# Patient Record
Sex: FEMALE | Race: OTHER | NOT HISPANIC OR LATINO | Employment: OTHER | URBAN - METROPOLITAN AREA
[De-identification: names, ages, dates, MRNs, and addresses within clinical notes are randomized per-mention and may not be internally consistent; named-entity substitution may affect disease eponyms.]

---

## 2018-11-29 NOTE — PROCEDURE NOTE: SURGICAL
"<span style=""font-weight: bold;"">MR #:</span> [id number]<br /> <br /> <span style=""font-weight: bold;"">PREOPERATIVE DIAGNOSIS:</span> Cataract

## 2018-12-06 NOTE — PROCEDURE NOTE: SURGICAL
"<span style=""font-weight: bold;"">MR #:</span> [id number]<br /> <br /> <span style=""font-weight: bold;"">PREOPERATIVE DIAGNOSIS:</span> Cataract

## 2019-05-24 NOTE — PATIENT DISCUSSION
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 It can make cataract surgery more difficult and increase the risk of complications including iris damage and posterior capsule rupture with possible need for a second surgery. instructed patient to use Systane night time oint ment OD QHS.

## 2019-05-31 NOTE — PATIENT DISCUSSION
[facility]arr Visual Field 36 point screen: I have reviewed the visual fields both taped and untaped on this patient which demonstrate significant obstruction of the patient's peripheral visual field on both eyes.

## 2019-05-31 NOTE — PATIENT DISCUSSION
PHOTOGRAPHS: I have reviewed the external ocular photographs of this patient which show the following: significant entropion of the right lower eyelid and significant ptosis and dermatochalasis of both upper eyelids.

## 2019-08-13 NOTE — PATIENT DISCUSSION
Resume normal activity. Resume any medications that were discontinued for surgery. Sutures removed without difficulty. Artificial tears prn burning/itching/blurry vision. Wash incisions/ lash line once daily with baby shampoo. Discussed skin care and sun avoidance at length. Sunscreen given. Follow up in one month.

## 2019-09-17 NOTE — PATIENT DISCUSSION
Patient is continuing to heal well following surgery. Reviewed skin care and sun avoidance. Eye cream given. Increase frequency of ATs. Follow up prn.

## 2020-01-01 PROBLEM — Z98.890: Noted: 2020-01-01

## 2021-12-02 ENCOUNTER — NEW PATIENT (OUTPATIENT)
Dept: URBAN - METROPOLITAN AREA CLINIC 9 | Facility: CLINIC | Age: 71
End: 2021-12-02

## 2021-12-02 DIAGNOSIS — H35.363: ICD-10-CM

## 2021-12-02 DIAGNOSIS — H25.13: ICD-10-CM

## 2021-12-02 DIAGNOSIS — H52.223: ICD-10-CM

## 2021-12-02 DIAGNOSIS — H40.013: ICD-10-CM

## 2021-12-02 PROCEDURE — 92004 COMPRE OPH EXAM NEW PT 1/>: CPT

## 2021-12-02 PROCEDURE — 92134 CPTRZ OPH DX IMG PST SGM RTA: CPT

## 2021-12-02 PROCEDURE — 92133 CPTRZD OPH DX IMG PST SGM ON: CPT

## 2021-12-02 PROCEDURE — 92015 DETERMINE REFRACTIVE STATE: CPT

## 2021-12-02 ASSESSMENT — KERATOMETRY
OD_K1POWER_DIOPTERS: 44.00
OD_K2POWER_DIOPTERS: 45.00
OD_AXISANGLE2_DEGREES: 98
OS_K2POWER_DIOPTERS: 45.25
OS_AXISANGLE2_DEGREES: 70
OS_K1POWER_DIOPTERS: 44.50
OS_AXISANGLE_DEGREES: 160
OD_AXISANGLE_DEGREES: 8

## 2021-12-02 ASSESSMENT — TONOMETRY
OD_IOP_MMHG: 18
OS_IOP_MMHG: 16

## 2021-12-02 ASSESSMENT — VISUAL ACUITY
OS_SC: 20/70-1
OD_SC: 20/80-1
OD_GLARE: 20/60-1
OS_GLARE: 20/40
OU_SC: 20/70+1

## 2022-04-19 ENCOUNTER — PRE-OP/H&P (OUTPATIENT)
Dept: URBAN - METROPOLITAN AREA CLINIC 9 | Facility: CLINIC | Age: 72
End: 2022-04-19

## 2022-04-19 VITALS — HEIGHT: 55 IN | DIASTOLIC BLOOD PRESSURE: 70 MMHG | SYSTOLIC BLOOD PRESSURE: 117 MMHG | HEART RATE: 77 BPM

## 2022-04-19 DIAGNOSIS — H25.13: ICD-10-CM

## 2022-04-19 PROCEDURE — 99211PRE PRE OP VISIT

## 2022-04-19 PROCEDURE — 92136 OPHTHALMIC BIOMETRY: CPT

## 2022-04-19 ASSESSMENT — KERATOMETRY
OD_K2POWER_DIOPTERS: 44.75
OS_K2POWER_DIOPTERS: 45.00
OS_AXISANGLE2_DEGREES: 112
OD_K1POWER_DIOPTERS: 44.25
OS_K1POWER_DIOPTERS: 44.50
OD_AXISANGLE2_DEGREES: 95
OS_AXISANGLE_DEGREES: 22
OD_AXISANGLE_DEGREES: 5

## 2022-04-27 ENCOUNTER — COMPREHENSIVE EXAM (OUTPATIENT)
Dept: URBAN - METROPOLITAN AREA CLINIC 9 | Facility: CLINIC | Age: 72
End: 2022-04-27

## 2022-04-27 DIAGNOSIS — H35.3132: ICD-10-CM

## 2022-04-27 DIAGNOSIS — H25.13: ICD-10-CM

## 2022-04-27 PROCEDURE — 92014 COMPRE OPH EXAM EST PT 1/>: CPT

## 2022-04-27 PROCEDURE — 92134 CPTRZ OPH DX IMG PST SGM RTA: CPT

## 2022-04-27 ASSESSMENT — TONOMETRY
OS_IOP_MMHG: 20
OD_IOP_MMHG: 22

## 2022-04-27 ASSESSMENT — KERATOMETRY
OD_K1POWER_DIOPTERS: 44.25
OD_K2POWER_DIOPTERS: 44.75
OS_AXISANGLE2_DEGREES: 112
OS_AXISANGLE_DEGREES: 22
OS_K1POWER_DIOPTERS: 44.50
OS_K2POWER_DIOPTERS: 45.00
OD_AXISANGLE_DEGREES: 5
OD_AXISANGLE2_DEGREES: 95

## 2022-04-27 ASSESSMENT — VISUAL ACUITY
OD_SC: 20/40
OS_SC: 20/60+1

## 2022-05-17 PROBLEM — Z98.890: Noted: 2020-01-01

## 2022-05-17 PROBLEM — Z96.1: Noted: 2022-05-17

## 2022-05-17 ASSESSMENT — KERATOMETRY
OS_AXISANGLE_DEGREES: 22
OD_AXISANGLE2_DEGREES: 95
OD_K2POWER_DIOPTERS: 44.75
OD_AXISANGLE_DEGREES: 5
OD_K1POWER_DIOPTERS: 44.25
OS_AXISANGLE2_DEGREES: 112
OS_K1POWER_DIOPTERS: 44.50
OS_K2POWER_DIOPTERS: 45.00

## 2022-05-18 ENCOUNTER — POST-OP (OUTPATIENT)
Dept: URBAN - METROPOLITAN AREA CLINIC 9 | Facility: CLINIC | Age: 72
End: 2022-05-18

## 2022-05-18 DIAGNOSIS — Z96.1: ICD-10-CM

## 2022-05-18 PROCEDURE — 99024 POSTOP FOLLOW-UP VISIT: CPT

## 2022-05-18 ASSESSMENT — VISUAL ACUITY
OU_CC: 20/25-2
OS_SC: 20/25-2

## 2022-05-18 ASSESSMENT — TONOMETRY: OS_IOP_MMHG: 21

## 2022-05-23 ENCOUNTER — POST OP/EVAL OF SECOND EYE (OUTPATIENT)
Dept: URBAN - METROPOLITAN AREA CLINIC 9 | Facility: CLINIC | Age: 72
End: 2022-05-23

## 2022-05-23 DIAGNOSIS — H25.11: ICD-10-CM

## 2022-05-23 DIAGNOSIS — Z96.1: ICD-10-CM

## 2022-05-23 PROCEDURE — 99024 POSTOP FOLLOW-UP VISIT: CPT

## 2022-05-23 PROCEDURE — 92136 OPHTHALMIC BIOMETRY: CPT

## 2022-05-23 ASSESSMENT — VISUAL ACUITY
OS_SC: J2
OU_SC: J1
OS_SC: 20/25-2
OU_SC: 20/30

## 2022-05-23 ASSESSMENT — KERATOMETRY
OD_AXISANGLE_DEGREES: 180
OS_AXISANGLE_DEGREES: 163
OD_K1POWER_DIOPTERS: 46.00
OD_AXISANGLE2_DEGREES: 90
OD_K2POWER_DIOPTERS: 46.75
OS_K2POWER_DIOPTERS: 45.50
OS_K1POWER_DIOPTERS: 44.25
OS_AXISANGLE2_DEGREES: 73

## 2022-05-23 ASSESSMENT — TONOMETRY: OS_IOP_MMHG: 20

## 2022-06-01 ENCOUNTER — POST-OP (OUTPATIENT)
Dept: URBAN - METROPOLITAN AREA CLINIC 9 | Facility: CLINIC | Age: 72
End: 2022-06-01

## 2022-06-01 DIAGNOSIS — Z96.1: ICD-10-CM

## 2022-06-01 PROCEDURE — 99024 POSTOP FOLLOW-UP VISIT: CPT

## 2022-06-01 RX ORDER — TIMOLOL MALEATE 6.8 MG/ML
1 SOLUTION OPHTHALMIC TWICE A DAY
Start: 2022-06-01

## 2022-06-01 ASSESSMENT — VISUAL ACUITY
OD_SC: 20/80-1
OU_SC: J1
OD_SC: J16
OS_SC: J1
OU_SC: 20/30-2
OS_SC: 20/40

## 2022-06-01 ASSESSMENT — TONOMETRY
OD_IOP_MMHG: 11
OD_IOP_MMHG: 30

## 2022-06-10 ENCOUNTER — POST-OP (OUTPATIENT)
Dept: URBAN - METROPOLITAN AREA CLINIC 9 | Facility: CLINIC | Age: 72
End: 2022-06-10

## 2022-06-10 DIAGNOSIS — Z96.1: ICD-10-CM

## 2022-06-10 PROCEDURE — 99024 POSTOP FOLLOW-UP VISIT: CPT

## 2022-06-10 ASSESSMENT — VISUAL ACUITY
OD_SC: J1+
OS: J2
OD: J7
OS_SC: 20/25-2
OS_SC: J1+
OU_SC: J1+
OD_SC: 20/40+1
OU_SC: 20/25-2
OU: J2

## 2022-06-10 ASSESSMENT — TONOMETRY
OD_IOP_MMHG: 15
OS_IOP_MMHG: 17

## 2022-06-13 ASSESSMENT — KERATOMETRY
OD_AXISANGLE_DEGREES: 11
OD_AXISANGLE2_DEGREES: 101
OS_K1POWER_DIOPTERS: 44.25
OD_K2POWER_DIOPTERS: 45.25
OS_AXISANGLE_DEGREES: 71
OD_K1POWER_DIOPTERS: 44.25
OS_K2POWER_DIOPTERS: 45.50
OS_AXISANGLE2_DEGREES: 161

## 2022-06-20 RX ORDER — INDAPAMIDE 1.25 MG/1
TABLET, FILM COATED ORAL
COMMUNITY

## 2022-06-20 RX ORDER — SPIRONOLACTONE 25 MG/1
TABLET ORAL
COMMUNITY

## 2022-06-20 RX ORDER — IBUPROFEN 200 MG
TABLET ORAL
COMMUNITY

## 2022-06-20 RX ORDER — PRAVASTATIN SODIUM 20 MG
TABLET ORAL
COMMUNITY

## 2022-06-20 RX ORDER — CELECOXIB 100 MG/1
CAPSULE ORAL
COMMUNITY

## 2022-06-20 RX ORDER — CITALOPRAM 20 MG/1
TABLET ORAL
COMMUNITY

## 2022-06-20 RX ORDER — MELOXICAM 7.5 MG/1
TABLET ORAL
COMMUNITY

## 2022-06-30 ENCOUNTER — POST-OP (OUTPATIENT)
Dept: URBAN - METROPOLITAN AREA CLINIC 9 | Facility: CLINIC | Age: 72
End: 2022-06-30

## 2022-06-30 DIAGNOSIS — Z96.1: ICD-10-CM

## 2022-06-30 PROCEDURE — 99024 POSTOP FOLLOW-UP VISIT: CPT

## 2022-06-30 ASSESSMENT — TONOMETRY
OD_IOP_MMHG: 18
OS_IOP_MMHG: 14

## 2022-06-30 ASSESSMENT — KERATOMETRY
OS_K2POWER_DIOPTERS: 45.50
OS_AXISANGLE2_DEGREES: 86
OD_K2POWER_DIOPTERS: 45.25
OS_K1POWER_DIOPTERS: 44.50
OD_AXISANGLE2_DEGREES: 100
OD_AXISANGLE_DEGREES: 010
OD_K1POWER_DIOPTERS: 44.00
OS_AXISANGLE_DEGREES: 176

## 2022-06-30 ASSESSMENT — VISUAL ACUITY
OD_SC: J1+
OS_SC: J1+
OU_SC: 20/20-1
OU_SC: J1+
OD_SC: 20/20-2
OS: J2
OU: J1
OD: J2
OS_SC: 20/20-2

## 2023-10-30 ENCOUNTER — ESTABLISHED PATIENT (OUTPATIENT)
Facility: LOCATION | Age: 73
End: 2023-10-30

## 2023-10-30 DIAGNOSIS — H40.013: ICD-10-CM

## 2023-10-30 DIAGNOSIS — H52.221: ICD-10-CM

## 2023-10-30 DIAGNOSIS — H35.3132: ICD-10-CM

## 2023-10-30 PROCEDURE — 92133 CPTRZD OPH DX IMG PST SGM ON: CPT

## 2023-10-30 PROCEDURE — 92015 DETERMINE REFRACTIVE STATE: CPT

## 2023-10-30 PROCEDURE — 92014 COMPRE OPH EXAM EST PT 1/>: CPT

## 2023-10-30 ASSESSMENT — KERATOMETRY
OS_K2POWER_DIOPTERS: 45.25
OS_K2POWER_DIOPTERS: 45.50
OS_K1POWER_DIOPTERS: 44.25
OD_K2POWER_DIOPTERS: 45.25
OD_K2POWER_DIOPTERS: 44.75
OS_AXISANGLE_DEGREES: 176
OD_AXISANGLE2_DEGREES: 87
OD_AXISANGLE_DEGREES: 010
OD_AXISANGLE2_DEGREES: 100
OD_AXISANGLE_DEGREES: 177
OD_K1POWER_DIOPTERS: 44.25
OD_K1POWER_DIOPTERS: 44.00
OS_AXISANGLE2_DEGREES: 79
OS_AXISANGLE_DEGREES: 169
OS_AXISANGLE2_DEGREES: 86
OS_K1POWER_DIOPTERS: 44.50

## 2023-10-30 ASSESSMENT — TONOMETRY
OD_IOP_MMHG: 21
OS_IOP_MMHG: 20

## 2023-10-30 ASSESSMENT — VISUAL ACUITY
OS_SC: 20/25+1
OU_SC: 20/20-1
OD_SC: 20/25+1

## 2023-11-02 ENCOUNTER — ESTABLISHED PATIENT (OUTPATIENT)
Dept: URBAN - METROPOLITAN AREA CLINIC 11 | Facility: CLINIC | Age: 73
End: 2023-11-02

## 2023-11-02 DIAGNOSIS — H26.493: ICD-10-CM

## 2023-11-02 DIAGNOSIS — H35.372: ICD-10-CM

## 2023-11-02 DIAGNOSIS — Z96.1: ICD-10-CM

## 2023-11-02 DIAGNOSIS — H43.313: ICD-10-CM

## 2023-11-02 DIAGNOSIS — H35.3131: ICD-10-CM

## 2023-11-02 DIAGNOSIS — H43.813: ICD-10-CM

## 2023-11-02 PROCEDURE — 92134 CPTRZ OPH DX IMG PST SGM RTA: CPT

## 2023-11-02 PROCEDURE — 92201 OPSCPY EXTND RTA DRAW UNI/BI: CPT

## 2023-11-02 PROCEDURE — 99214 OFFICE O/P EST MOD 30 MIN: CPT

## 2023-11-02 ASSESSMENT — KERATOMETRY
OD_AXISANGLE2_DEGREES: 87
OD_AXISANGLE_DEGREES: 010
OS_AXISANGLE2_DEGREES: 79
OS_K2POWER_DIOPTERS: 45.50
OS_K1POWER_DIOPTERS: 44.50
OS_K2POWER_DIOPTERS: 45.25
OS_AXISANGLE_DEGREES: 169
OD_K1POWER_DIOPTERS: 44.00
OS_AXISANGLE2_DEGREES: 86
OS_K1POWER_DIOPTERS: 44.25
OD_AXISANGLE_DEGREES: 177
OD_AXISANGLE2_DEGREES: 100
OD_K1POWER_DIOPTERS: 44.25
OD_K2POWER_DIOPTERS: 45.25
OD_K2POWER_DIOPTERS: 44.75
OS_AXISANGLE_DEGREES: 176

## 2023-11-02 ASSESSMENT — TONOMETRY
OD_IOP_MMHG: 12
OS_IOP_MMHG: 11

## 2023-11-02 ASSESSMENT — VISUAL ACUITY
OS_SC: 20/25-1
OD_SC: 20/25+2

## 2023-12-06 ENCOUNTER — SURGERY/PROCEDURE (OUTPATIENT)
Dept: URBAN - METROPOLITAN AREA EYE CENTER 1 | Facility: EYE CENTER | Age: 73
End: 2023-12-06

## 2023-12-06 DIAGNOSIS — H43.311: ICD-10-CM

## 2023-12-06 PROCEDURE — 67036 REMOVAL OF INNER EYE FLUID: CPT

## 2023-12-06 ASSESSMENT — KERATOMETRY
OD_K2POWER_DIOPTERS: 44.75
OD_AXISANGLE2_DEGREES: 87
OS_AXISANGLE_DEGREES: 169
OD_AXISANGLE_DEGREES: 010
OS_AXISANGLE2_DEGREES: 86
OD_K1POWER_DIOPTERS: 44.00
OD_K2POWER_DIOPTERS: 45.25
OD_AXISANGLE_DEGREES: 177
OS_AXISANGLE2_DEGREES: 79
OD_AXISANGLE2_DEGREES: 87
OS_K2POWER_DIOPTERS: 45.25
OD_K2POWER_DIOPTERS: 45.25
OD_AXISANGLE_DEGREES: 010
OD_K1POWER_DIOPTERS: 44.00
OS_K1POWER_DIOPTERS: 44.25
OD_K1POWER_DIOPTERS: 44.25
OD_AXISANGLE2_DEGREES: 100
OS_AXISANGLE2_DEGREES: 79
OD_K2POWER_DIOPTERS: 44.75
OS_K1POWER_DIOPTERS: 44.25
OS_AXISANGLE2_DEGREES: 86
OS_AXISANGLE_DEGREES: 176
OS_K1POWER_DIOPTERS: 44.50
OS_K2POWER_DIOPTERS: 45.50
OD_AXISANGLE2_DEGREES: 100
OS_K2POWER_DIOPTERS: 45.25
OS_AXISANGLE_DEGREES: 169
OS_K2POWER_DIOPTERS: 45.50
OS_AXISANGLE_DEGREES: 176
OD_AXISANGLE_DEGREES: 177
OS_K1POWER_DIOPTERS: 44.50
OD_K1POWER_DIOPTERS: 44.25

## 2023-12-07 ENCOUNTER — POST-OP (OUTPATIENT)
Dept: URBAN - METROPOLITAN AREA CLINIC 11 | Facility: CLINIC | Age: 73
End: 2023-12-07

## 2023-12-07 DIAGNOSIS — H43.313: ICD-10-CM

## 2023-12-07 PROCEDURE — 99024 POSTOP FOLLOW-UP VISIT: CPT

## 2023-12-07 ASSESSMENT — TONOMETRY
OD_IOP_MMHG: 14
OS_IOP_MMHG: 12

## 2023-12-07 ASSESSMENT — VISUAL ACUITY
OD_SC: 20/30-2
OS_SC: 20/30-2

## 2023-12-13 ASSESSMENT — KERATOMETRY
OS_K1POWER_DIOPTERS: 44.25
OD_K1POWER_DIOPTERS: 44.00
OD_AXISANGLE_DEGREES: 010
OS_K2POWER_DIOPTERS: 45.50
OS_AXISANGLE2_DEGREES: 86
OD_AXISANGLE2_DEGREES: 87
OS_K2POWER_DIOPTERS: 45.25
OS_AXISANGLE2_DEGREES: 79
OD_K1POWER_DIOPTERS: 44.25
OD_K2POWER_DIOPTERS: 45.25
OD_K2POWER_DIOPTERS: 44.75
OS_AXISANGLE_DEGREES: 169
OD_AXISANGLE2_DEGREES: 100
OS_AXISANGLE_DEGREES: 176
OS_K1POWER_DIOPTERS: 44.50
OD_AXISANGLE_DEGREES: 177

## 2023-12-14 ENCOUNTER — POST-OP (OUTPATIENT)
Dept: URBAN - METROPOLITAN AREA CLINIC 11 | Facility: CLINIC | Age: 73
End: 2023-12-14

## 2023-12-14 DIAGNOSIS — H43.313: ICD-10-CM

## 2023-12-14 PROCEDURE — 99024 POSTOP FOLLOW-UP VISIT: CPT

## 2023-12-14 ASSESSMENT — VISUAL ACUITY
OD_SC: 20/25-1
OS_SC: 20/25-1

## 2023-12-14 ASSESSMENT — TONOMETRY
OD_IOP_MMHG: 15
OS_IOP_MMHG: 18

## 2024-06-03 ENCOUNTER — ESTABLISHED PATIENT (OUTPATIENT)
Dept: URBAN - METROPOLITAN AREA CLINIC 11 | Facility: CLINIC | Age: 74
End: 2024-06-03

## 2024-06-03 DIAGNOSIS — H43.812: ICD-10-CM

## 2024-06-03 DIAGNOSIS — H33.012: ICD-10-CM

## 2024-06-03 DIAGNOSIS — H43.312: ICD-10-CM

## 2024-06-03 DIAGNOSIS — H35.3131: ICD-10-CM

## 2024-06-03 PROCEDURE — 67105 REPAIR DETACHED RETINA PC: CPT

## 2024-06-03 PROCEDURE — 92201 OPSCPY EXTND RTA DRAW UNI/BI: CPT

## 2024-06-03 PROCEDURE — 99214 OFFICE O/P EST MOD 30 MIN: CPT | Mod: 57

## 2024-06-03 PROCEDURE — 92134 CPTRZ OPH DX IMG PST SGM RTA: CPT

## 2024-06-03 ASSESSMENT — KERATOMETRY
OS_K2POWER_DIOPTERS: 45.50
OD_K1POWER_DIOPTERS: 44.00
OS_K1POWER_DIOPTERS: 44.50
OS_AXISANGLE_DEGREES: 169
OS_K2POWER_DIOPTERS: 45.25
OD_AXISANGLE2_DEGREES: 87
OD_AXISANGLE2_DEGREES: 100
OS_K1POWER_DIOPTERS: 44.25
OD_K1POWER_DIOPTERS: 44.25
OD_AXISANGLE_DEGREES: 177
OS_AXISANGLE_DEGREES: 176
OD_K2POWER_DIOPTERS: 44.75
OD_K2POWER_DIOPTERS: 45.25
OS_AXISANGLE2_DEGREES: 79
OS_AXISANGLE2_DEGREES: 86
OD_AXISANGLE_DEGREES: 010

## 2024-06-03 ASSESSMENT — VISUAL ACUITY
OS_SC: 20/30-1
OD_SC: 20/20-2

## 2024-06-03 ASSESSMENT — TONOMETRY
OS_IOP_MMHG: 19
OD_IOP_MMHG: 20

## 2024-06-09 ASSESSMENT — KERATOMETRY
OS_AXISANGLE_DEGREES: 169
OS_K1POWER_DIOPTERS: 44.50
OD_AXISANGLE_DEGREES: 177
OS_K2POWER_DIOPTERS: 45.50
OS_AXISANGLE2_DEGREES: 79
OD_AXISANGLE2_DEGREES: 100
OD_K1POWER_DIOPTERS: 44.25
OD_K1POWER_DIOPTERS: 44.00
OS_K1POWER_DIOPTERS: 44.25
OD_AXISANGLE2_DEGREES: 87
OS_K2POWER_DIOPTERS: 45.25
OS_AXISANGLE_DEGREES: 176
OD_AXISANGLE_DEGREES: 010
OD_K2POWER_DIOPTERS: 45.25
OS_AXISANGLE2_DEGREES: 86
OD_K2POWER_DIOPTERS: 44.75

## 2024-06-11 ENCOUNTER — POST-OP (OUTPATIENT)
Dept: URBAN - METROPOLITAN AREA CLINIC 11 | Facility: CLINIC | Age: 74
End: 2024-06-11

## 2024-06-11 DIAGNOSIS — H33.012: ICD-10-CM

## 2024-06-11 PROCEDURE — 99024 POSTOP FOLLOW-UP VISIT: CPT

## 2024-06-11 ASSESSMENT — TONOMETRY
OD_IOP_MMHG: 20
OS_IOP_MMHG: 20

## 2024-06-11 ASSESSMENT — VISUAL ACUITY
OS_SC: 20/30-1
OD_SC: 20/20-1

## 2024-06-12 ENCOUNTER — SURGERY/PROCEDURE (OUTPATIENT)
Dept: URBAN - METROPOLITAN AREA EYE CENTER 1 | Facility: EYE CENTER | Age: 74
End: 2024-06-12

## 2024-06-12 DIAGNOSIS — H43.312: ICD-10-CM

## 2024-06-12 DIAGNOSIS — H33.012: ICD-10-CM

## 2024-06-12 PROCEDURE — 67036 REMOVAL OF INNER EYE FLUID: CPT | Mod: 58,LT,79,LT

## 2024-06-12 ASSESSMENT — KERATOMETRY
OS_AXISANGLE2_DEGREES: 86
OS_AXISANGLE2_DEGREES: 86
OD_K1POWER_DIOPTERS: 44.25
OD_K2POWER_DIOPTERS: 44.75
OS_K2POWER_DIOPTERS: 45.25
OD_AXISANGLE_DEGREES: 177
OD_K2POWER_DIOPTERS: 45.25
OS_AXISANGLE_DEGREES: 169
OD_K1POWER_DIOPTERS: 44.00
OD_K2POWER_DIOPTERS: 44.75
OS_K1POWER_DIOPTERS: 44.25
OD_AXISANGLE2_DEGREES: 100
OS_K1POWER_DIOPTERS: 44.25
OD_K1POWER_DIOPTERS: 44.25
OD_K1POWER_DIOPTERS: 44.00
OS_K2POWER_DIOPTERS: 45.50
OD_AXISANGLE2_DEGREES: 87
OS_AXISANGLE2_DEGREES: 79
OS_K2POWER_DIOPTERS: 45.25
OS_AXISANGLE_DEGREES: 176
OD_AXISANGLE_DEGREES: 010
OD_AXISANGLE_DEGREES: 010
OD_AXISANGLE2_DEGREES: 87
OS_K1POWER_DIOPTERS: 44.50
OS_K2POWER_DIOPTERS: 45.50
OS_AXISANGLE_DEGREES: 169
OS_AXISANGLE2_DEGREES: 79
OS_AXISANGLE_DEGREES: 176
OD_AXISANGLE_DEGREES: 177
OD_AXISANGLE2_DEGREES: 100
OD_K2POWER_DIOPTERS: 45.25
OS_K1POWER_DIOPTERS: 44.50

## 2024-06-13 ENCOUNTER — POST-OP (OUTPATIENT)
Dept: URBAN - METROPOLITAN AREA CLINIC 11 | Facility: CLINIC | Age: 74
End: 2024-06-13

## 2024-06-13 DIAGNOSIS — H33.012: ICD-10-CM

## 2024-06-13 DIAGNOSIS — H43.312: ICD-10-CM

## 2024-06-13 PROCEDURE — 99024 POSTOP FOLLOW-UP VISIT: CPT

## 2024-06-13 ASSESSMENT — VISUAL ACUITY
OS_SC: 20/50-1
OD_SC: 20/20

## 2024-06-13 ASSESSMENT — TONOMETRY
OS_IOP_MMHG: 24
OD_IOP_MMHG: 23

## 2024-06-20 ENCOUNTER — POST-OP (OUTPATIENT)
Dept: URBAN - METROPOLITAN AREA CLINIC 11 | Facility: CLINIC | Age: 74
End: 2024-06-20

## 2024-06-20 DIAGNOSIS — H33.012: ICD-10-CM

## 2024-06-20 PROCEDURE — 99024 POSTOP FOLLOW-UP VISIT: CPT

## 2024-06-20 ASSESSMENT — KERATOMETRY
OS_K1POWER_DIOPTERS: 44.25
OS_K2POWER_DIOPTERS: 45.50
OS_AXISANGLE_DEGREES: 169
OD_K1POWER_DIOPTERS: 44.25
OD_K2POWER_DIOPTERS: 44.75
OS_K2POWER_DIOPTERS: 45.25
OD_AXISANGLE_DEGREES: 177
OD_K1POWER_DIOPTERS: 44.00
OS_AXISANGLE2_DEGREES: 79
OD_K2POWER_DIOPTERS: 45.25
OD_AXISANGLE2_DEGREES: 87
OS_K1POWER_DIOPTERS: 44.50
OD_AXISANGLE_DEGREES: 010
OD_AXISANGLE2_DEGREES: 100
OS_AXISANGLE_DEGREES: 176
OS_AXISANGLE2_DEGREES: 86

## 2024-06-20 ASSESSMENT — TONOMETRY
OS_IOP_MMHG: 8
OD_IOP_MMHG: 10

## 2024-06-20 ASSESSMENT — VISUAL ACUITY
OD_SC: 20/20-2
OS_SC: 20/30-1